# Patient Record
Sex: MALE | Race: WHITE | HISPANIC OR LATINO | Employment: FULL TIME | URBAN - METROPOLITAN AREA
[De-identification: names, ages, dates, MRNs, and addresses within clinical notes are randomized per-mention and may not be internally consistent; named-entity substitution may affect disease eponyms.]

---
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
## 2022-08-07 ENCOUNTER — HOSPITAL ENCOUNTER (OUTPATIENT)
Dept: RADIOLOGY | Facility: MEDICAL CENTER | Age: 22
End: 2022-08-07
Attending: PHYSICIAN ASSISTANT
Payer: COMMERCIAL

## 2022-08-07 ENCOUNTER — HOSPITAL ENCOUNTER (OUTPATIENT)
Facility: MEDICAL CENTER | Age: 22
End: 2022-08-07
Attending: PHYSICIAN ASSISTANT
Payer: COMMERCIAL

## 2022-08-07 ENCOUNTER — OFFICE VISIT (OUTPATIENT)
Dept: URGENT CARE | Facility: CLINIC | Age: 22
End: 2022-08-07
Payer: COMMERCIAL

## 2022-08-07 VITALS
HEIGHT: 67 IN | HEART RATE: 84 BPM | OXYGEN SATURATION: 97 % | TEMPERATURE: 98.7 F | RESPIRATION RATE: 16 BRPM | WEIGHT: 170 LBS | BODY MASS INDEX: 26.68 KG/M2 | DIASTOLIC BLOOD PRESSURE: 60 MMHG | SYSTOLIC BLOOD PRESSURE: 124 MMHG

## 2022-08-07 DIAGNOSIS — N50.89 SCROTAL SWELLING: ICD-10-CM

## 2022-08-07 DIAGNOSIS — N45.1 EPIDIDYMITIS: Primary | ICD-10-CM

## 2022-08-07 LAB
APPEARANCE UR: NORMAL
BILIRUB UR STRIP-MCNC: NEGATIVE MG/DL
COLOR UR AUTO: YELLOW
GLUCOSE UR STRIP.AUTO-MCNC: NEGATIVE MG/DL
KETONES UR STRIP.AUTO-MCNC: NEGATIVE MG/DL
LEUKOCYTE ESTERASE UR QL STRIP.AUTO: NORMAL
NITRITE UR QL STRIP.AUTO: NEGATIVE
PH UR STRIP.AUTO: 6 [PH] (ref 5–8)
PROT UR QL STRIP: NORMAL MG/DL
RBC UR QL AUTO: NORMAL
SP GR UR STRIP.AUTO: 1.03
UROBILINOGEN UR STRIP-MCNC: 0.2 MG/DL

## 2022-08-07 PROCEDURE — 87491 CHLMYD TRACH DNA AMP PROBE: CPT

## 2022-08-07 PROCEDURE — 87591 N.GONORRHOEAE DNA AMP PROB: CPT

## 2022-08-07 PROCEDURE — 99204 OFFICE O/P NEW MOD 45 MIN: CPT | Performed by: PHYSICIAN ASSISTANT

## 2022-08-07 PROCEDURE — 76870 US EXAM SCROTUM: CPT

## 2022-08-07 PROCEDURE — 87086 URINE CULTURE/COLONY COUNT: CPT

## 2022-08-07 PROCEDURE — 81002 URINALYSIS NONAUTO W/O SCOPE: CPT | Performed by: PHYSICIAN ASSISTANT

## 2022-08-07 RX ORDER — LEVOFLOXACIN 500 MG/1
500 TABLET, FILM COATED ORAL DAILY
Qty: 10 TABLET | Refills: 0 | Status: SHIPPED | OUTPATIENT
Start: 2022-08-07 | End: 2022-08-17

## 2022-08-07 ASSESSMENT — ENCOUNTER SYMPTOMS
SHORTNESS OF BREATH: 0
SORE THROAT: 0
VOMITING: 0
MYALGIAS: 0
CHILLS: 0
FEVER: 0
HEADACHES: 0
ABDOMINAL PAIN: 0
FLANK PAIN: 0
DIARRHEA: 0
EYE PAIN: 0
NAUSEA: 0
COUGH: 0
CONSTIPATION: 0

## 2022-08-07 NOTE — PROGRESS NOTES
"Subjective:   Garret Zuniga is a 22 y.o. male who presents for Testicle Swelling (X4-5days, last 2 days have been worse, painful, stung a little to pee, Pt states that one of his testicles is twice the size of the other one )      This a 22-year-old male who over the last 4 to 5 days had noticed mild right-sided testicular discomfort, similar to the feeling of getting hit in the scrotum.  Symptoms seem to wane however he was at work doing a large amount of physical labor 2 days ago and the pain seemed to be significantly worse throughout the evening.  He also noticed more focal right-sided testicular swelling.  Also over the last 48 hours he has noticed some mild stinging dysuria without frequency but he does endorse urgency.  He has noticed some discolored yellowish semen.  He is also noticed more mucopurulent urine however no cindy discharge or rash.  Remote history of testicular torsion as an infant, unsure which side.  He suspects he may have had a previous urinary tract infection when he was 15 years old or thereabouts but is not quite sure.  He reports monogamy with a single female partner, has no specific concerns about STDs.      Review of Systems   Constitutional: Negative for chills and fever.   HENT: Negative for congestion, ear pain and sore throat.    Eyes: Negative for pain.   Respiratory: Negative for cough and shortness of breath.    Cardiovascular: Negative for chest pain.   Gastrointestinal: Negative for abdominal pain, constipation, diarrhea, nausea and vomiting.   Genitourinary: Positive for dysuria and urgency. Negative for flank pain, frequency and hematuria.   Musculoskeletal: Negative for myalgias.   Skin: Negative for rash.   Neurological: Negative for headaches.       Medications, Allergies, and current problem list reviewed today in Epic.     Objective:     /60   Pulse 84   Temp 37.1 °C (98.7 °F) (Temporal)   Resp 16   Ht 1.702 m (5' 7\")   Wt 77.1 kg (170 lb)   SpO2 97% "     Physical Exam  Vitals reviewed.   Constitutional:       Appearance: Normal appearance.   HENT:      Head: Normocephalic and atraumatic.      Right Ear: External ear normal.      Left Ear: External ear normal.      Nose: Nose normal.      Mouth/Throat:      Mouth: Mucous membranes are moist.   Eyes:      Conjunctiva/sclera: Conjunctivae normal.   Cardiovascular:      Rate and Rhythm: Normal rate.   Pulmonary:      Effort: Pulmonary effort is normal.   Genitourinary:     Comments: Right-sided scrotal swelling, epididymal swelling versus epididymal cyst versus other cystic structure posterior to the right testicle.  Mildly enlarged right testicle compared to left.  Right testicle is lower hanging, no Bell Clapper deformity, cremasteric reflex intact.  Inguinal canals clear.  Otherwise uncircumcised male with no lymphadenopathy or deformity otherwise.  Skin:     General: Skin is warm and dry.      Capillary Refill: Capillary refill takes less than 2 seconds.   Neurological:      Mental Status: He is alert and oriented to person, place, and time.         Lab Results/POC Test Results   Results for orders placed or performed in visit on 08/07/22   POCT Urinalysis   Result Value Ref Range    POC Color yellow Negative    POC Appearance cloudy Negative    POC Leukocyte Esterase small Negative    POC Nitrites negative Negative    POC Urobiligen 0.2 Negative (0.2) mg/dL    POC Protein trace Negative mg/dL    POC Urine PH 6.0 5.0 - 8.0    POC Blood small Negative    POC Specific Gravity 1.030 <1.005 - >1.030    POC Ketones negative Negative mg/dL    POC Bilirubin negative Negative mg/dL    POC Glucose negative Negative mg/dL           RADIOLOGY RESULTS   [id number]    Result Date: 8/7/2022 8/7/2022 2:07 PM HISTORY/REASON FOR EXAM: Swelling; right testicular swelling, suspect epididymitis vs cyst.. TECHNIQUE/EXAM DESCRIPTION: Real-time sonography of the scrotum was performed with gray-scale, color and duplex Doppler  imaging. COMPARISON: October 27, 2014 FINDINGS: The right testis measures 4.30 cm x 2.04 cm x 2.96 cm. Normal in size and echotexture. Normal vascularity on color Doppler. No intratesticular mass. The left testis measures 4.03 cm x 2.16 cm x 3.20 cm. Normal in size and echotexture. Normal vascularity on color Doppler. No intratesticular mass. Vascular flow is symmetric. The right epididymis is enlarged and heterogeneous and hypervascular vascular. No abnormal volume hydrocele is detected. No varicocele is detected.     1.  Enlarged, heterogeneous, hyperemic right epididymis consistent with epididymitis.           Assessment/Plan:     Diagnosis and associated orders:     1. Epididymitis  levoFLOXacin (LEVAQUIN) 500 MG tablet   2. Scrotal swelling  POCT Urinalysis    [id number]    URINE CULTURE(NEW)    Chlamydia/GC, PCR (Urine)      Comments/MDM:     Patient with somewhat mixed features, does not appear to be torsion at this time especially given the subacute onset, regardless he has been on 48 hours and we were able to get him in for a timely ultrasound at 230 today.  His urinalysis does show evidence of irritation, not strongly suggestive of urinary tract infection.  At this point I have the strong suspicion for epididymitis, I do have concern of gonococcal or chlamydial infection causing this.  We will wait on the results of the scrotum ultrasound I will plan on calling the patient thereafter and we can discuss potential antibiotic therapy as well as other supportive care.    Update: Patient with ultrasound consistent with my physical exam revealing epididymitis, possible epididymal orchitis.  He McCarson with the patient he was quite confident that he is not at risk for gonorrhea or chlamydial infection, despite his age given his circumstance we will initiate levofloxacin per up-to-date guidelines.  Discussed black box warnings over telephone.  Urine culture, gynecological testing pending at this point,  we will follow-up with patient especially for need to change therapy.  Discussed nonsteroidal anti-inflammatories, supportive undergarments and follow-up as needed       Differential diagnosis, natural history, supportive care, and indications for immediate follow-up discussed.    Advised the patient to follow-up with the primary care physician for recheck, reevaluation, and consideration of further management.    Please note that this dictation was created using voice recognition software. I have made a reasonable attempt to correct obvious errors, but I expect that there are errors of grammar and possibly content that I did not discover before finalizing the note.    This note was electronically signed by Syed Isaac PA-C

## 2022-08-08 LAB
C TRACH DNA SPEC QL NAA+PROBE: NEGATIVE
N GONORRHOEA DNA SPEC QL NAA+PROBE: NEGATIVE
SPECIMEN SOURCE: NORMAL

## 2022-08-09 LAB
BACTERIA UR CULT: NORMAL
SIGNIFICANT IND 70042: NORMAL
SITE SITE: NORMAL
SOURCE SOURCE: NORMAL

## 2024-01-02 ENCOUNTER — OFFICE VISIT (OUTPATIENT)
Dept: URGENT CARE | Facility: CLINIC | Age: 24
End: 2024-01-02
Payer: COMMERCIAL

## 2024-01-02 VITALS
TEMPERATURE: 97.9 F | DIASTOLIC BLOOD PRESSURE: 70 MMHG | HEIGHT: 66 IN | OXYGEN SATURATION: 96 % | RESPIRATION RATE: 18 BRPM | HEART RATE: 74 BPM | BODY MASS INDEX: 27.64 KG/M2 | SYSTOLIC BLOOD PRESSURE: 128 MMHG | WEIGHT: 172 LBS

## 2024-01-02 DIAGNOSIS — Z87.09 HISTORY OF ASTHMA: ICD-10-CM

## 2024-01-02 DIAGNOSIS — R11.2 NAUSEA AND VOMITING, UNSPECIFIED VOMITING TYPE: ICD-10-CM

## 2024-01-02 DIAGNOSIS — J06.9 UPPER RESPIRATORY TRACT INFECTION, UNSPECIFIED TYPE: ICD-10-CM

## 2024-01-02 PROCEDURE — 3078F DIAST BP <80 MM HG: CPT

## 2024-01-02 PROCEDURE — 3074F SYST BP LT 130 MM HG: CPT

## 2024-01-02 PROCEDURE — 99214 OFFICE O/P EST MOD 30 MIN: CPT

## 2024-01-02 RX ORDER — ONDANSETRON 4 MG/1
4 TABLET, ORALLY DISINTEGRATING ORAL EVERY 6 HOURS PRN
Qty: 15 TABLET | Refills: 0 | Status: SHIPPED | OUTPATIENT
Start: 2024-01-02

## 2024-01-02 RX ORDER — ALBUTEROL SULFATE 90 UG/1
2 AEROSOL, METERED RESPIRATORY (INHALATION) EVERY 6 HOURS PRN
Qty: 8.5 G | Refills: 0 | Status: SHIPPED | OUTPATIENT
Start: 2024-01-02

## 2024-01-02 RX ORDER — METHYLPREDNISOLONE 4 MG/1
TABLET ORAL
Qty: 21 TABLET | Refills: 0 | Status: SHIPPED | OUTPATIENT
Start: 2024-01-02

## 2024-01-02 NOTE — LETTER
January 2, 2024    To Whom It May Concern:         This is confirmation that Garret Zuniga attended his scheduled appointment with SHANNA Burdick on 1/02/24.    He may return to work on 01/03/24.         If you have any questions please do not hesitate to call me at the phone number listed below.    Sincerely,          LORETTA Burdick.  [phone]

## 2024-01-03 NOTE — PROGRESS NOTES
"Subjective:   Garret Zuniga is a 23 y.o. male who presents for Other (X 5 days, started as Flu-like symptoms: headache, fever, negative Home Covid Test, lower back side of calves\"nerve\" pain, \"electricity shooting up\" Rt hand pain, Rt hip mainly pain,  SOB as he walks a lot/with movement, dizzy from the heat(showers), fever's gone away: hard time regulating temp. )      HPI:    Patient presents to  with concerns of flu-like symptoms  Reports rhinorrhea, nasal congestion, dry cough, headache  Symptoms started 5 days ago  Symptoms controlled with otc antipyretics, cold medications  Continues to have difficulty controlling body temperature, and is having \"nerve like pain\"  Reports he is having pain in his back  Emesis started today, reports 4 episodes.   Tolerating fluids. Reports decreased appetite.   Reports SOB with activity. Cough is described as bark like and has been this way his whole life.  Reports pmh of asthma, does not use controller or rescue inhalers  Denies recent exacerbations.  Negative covid test        ROS As above in HPI    Medications:    No current outpatient medications on file prior to visit.     No current facility-administered medications on file prior to visit.        Allergies:   Patient has no known allergies.    Problem List:   There is no problem list on file for this patient.       Surgical History:  Past Surgical History:   Procedure Laterality Date    DENTAL SURGERY         Past Social Hx:   Social History     Tobacco Use    Smoking status: Never    Smokeless tobacco: Never   Vaping Use    Vaping Use: Former   Substance Use Topics    Alcohol use: No    Drug use: Yes     Types: Inhaled, Marijuana     Comment: not since 12/28/23          Problem list, medications, and allergies reviewed by myself today in Epic.     Objective:     /70 (BP Location: Left arm, Patient Position: Sitting, BP Cuff Size: Adult)   Pulse 74   Temp 36.6 °C (97.9 °F) (Temporal)   Resp 18   Ht 1.664 m " "(5' 5.5\")   Wt 78 kg (172 lb)   SpO2 96%   BMI 28.19 kg/m²     Physical Exam  Vitals and nursing note reviewed.   Constitutional:       General: He is not in acute distress.     Appearance: Normal appearance. He is not ill-appearing or diaphoretic.   HENT:      Head: Normocephalic.      Right Ear: Tympanic membrane and ear canal normal.      Left Ear: Tympanic membrane and ear canal normal.      Nose: Congestion and rhinorrhea present.      Mouth/Throat:      Mouth: Mucous membranes are moist.      Pharynx: Oropharynx is clear. Posterior oropharyngeal erythema present.   Cardiovascular:      Rate and Rhythm: Normal rate and regular rhythm.      Heart sounds: Normal heart sounds. No murmur heard.     No friction rub. No gallop.   Pulmonary:      Effort: Pulmonary effort is normal. No respiratory distress.      Breath sounds: No stridor. Examination of the right-lower field reveals decreased breath sounds. Decreased breath sounds present. No wheezing, rhonchi or rales.   Chest:      Chest wall: No tenderness.   Abdominal:      General: Abdomen is flat. Bowel sounds are normal.      Palpations: Abdomen is soft.   Musculoskeletal:      Cervical back: No rigidity or tenderness.   Lymphadenopathy:      Cervical: No cervical adenopathy.   Skin:     General: Skin is warm and dry.      Capillary Refill: Capillary refill takes less than 2 seconds.      Findings: No rash.   Neurological:      Mental Status: He is alert and oriented to person, place, and time.         Assessment/Plan:       Diagnosis and associated orders:   1. Nausea and vomiting, unspecified vomiting type  - ondansetron (ZOFRAN ODT) 4 MG TABLET DISPERSIBLE; Take 1 Tablet by mouth every 6 hours as needed for Nausea/Vomiting for up to 15 doses.  Dispense: 15 Tablet; Refill: 0    2. Upper respiratory tract infection, unspecified type    3. History of asthma  - albuterol 108 (90 Base) MCG/ACT Aero Soln inhalation aerosol; Inhale 2 Puffs every 6 hours as needed " for Shortness of Breath.  Dispense: 8.5 g; Refill: 0  - methylPREDNISolone (MEDROL DOSEPAK) 4 MG Tablet Therapy Pack; Follow schedule on package instructions.  Dispense: 21 Tablet; Refill: 0  - Referral to establish with PCP        Comments/MDM:     Viral testing deferred due to duration of illness. He is aware he is outside the timeframe for antiviral medication.  Vital signs are stable, patient is nontoxic. No signs of respiratory distress.  Has pmh of mild and intermittent asthma, which has been likely more persistent due to concurrent illness. Referral to PCP ordered for spirometry.  Supportive measures encouraged: Rest, increased oral hydration, NSAIDs/tylenol as needed per package instructions, decongestant, warm humidification, otc antihistamines, Flonase, cough suppressant as needed   Strict return to ER precautions reviewed  Work note provided        Please note that this dictation was created using voice recognition software. I have made a reasonable attempt to correct obvious errors, but I expect that there are errors of grammar and possibly content that I did not discover before finalizing the note.    This note was electronically signed by LILIA Jessica

## 2024-01-16 ENCOUNTER — TELEPHONE (OUTPATIENT)
Dept: HEALTH INFORMATION MANAGEMENT | Facility: OTHER | Age: 24
End: 2024-01-16
Payer: COMMERCIAL

## 2024-02-20 ENCOUNTER — TELEPHONE (OUTPATIENT)
Dept: MEDICAL GROUP | Facility: LAB | Age: 24
End: 2024-02-20

## 2024-02-20 NOTE — LETTER
2/20/2024    Garret Zuniga  [address]  [address]  [place]  [place] [address]        Dear Garret,    Your care is very important to us, and we have noticed that on 2/20/2024, you missed your appointment with Pcp [facility] at [facility]    We’re committed to providing you with the best care possible. Your appointment time is reserved for you and your provider to discuss any current or new health concerns and, together, determine the best plan of care for you. Please call [phone] to reschedule at your earliest convenience.    In some cases, [facility] offers additional resources to make your healthcare more accessible, including transportation assistance, financial assistance and virtual visits. To learn more about these resources, please call [phone].    In order to keep you as informed as possible, below is a brief summary of our policy regarding missed appointments:  If a patient “No Shows”  three (3) or more appointments within a rolling 12-month period, they may be dismissed from the practice for failure to follow clinician recommendations.     If you have any concerns regarding the care you are receiving, please talk with your provider or call the office at [phone] and request to speak with the Practice . We’re committed to providing excellent care, and your feedback is invaluable.    Sincerely,    [facility]

## 2024-02-21 ENCOUNTER — TELEPHONE (OUTPATIENT)
Dept: HEALTH INFORMATION MANAGEMENT | Facility: OTHER | Age: 24
End: 2024-02-21
Payer: COMMERCIAL

## 2024-11-25 ENCOUNTER — OFFICE VISIT (OUTPATIENT)
Dept: URGENT CARE | Facility: CLINIC | Age: 24
End: 2024-11-25
Payer: COMMERCIAL

## 2024-11-25 VITALS
BODY MASS INDEX: 28.88 KG/M2 | WEIGHT: 184 LBS | OXYGEN SATURATION: 97 % | SYSTOLIC BLOOD PRESSURE: 124 MMHG | HEART RATE: 69 BPM | HEIGHT: 67 IN | DIASTOLIC BLOOD PRESSURE: 70 MMHG | RESPIRATION RATE: 16 BRPM | TEMPERATURE: 99.3 F

## 2024-11-25 DIAGNOSIS — J02.9 SORE THROAT: ICD-10-CM

## 2024-11-25 LAB — S PYO DNA SPEC NAA+PROBE: NOT DETECTED

## 2024-11-25 PROCEDURE — 3074F SYST BP LT 130 MM HG: CPT | Performed by: PHYSICIAN ASSISTANT

## 2024-11-25 PROCEDURE — 3078F DIAST BP <80 MM HG: CPT | Performed by: PHYSICIAN ASSISTANT

## 2024-11-25 PROCEDURE — 99213 OFFICE O/P EST LOW 20 MIN: CPT | Performed by: PHYSICIAN ASSISTANT

## 2024-11-25 PROCEDURE — 87651 STREP A DNA AMP PROBE: CPT | Performed by: PHYSICIAN ASSISTANT

## 2024-11-25 NOTE — PROGRESS NOTES
"Subjective:     Verbal consent was acquired by the patient to use [employer] ambient listening note generation during this visit     Garret Zuniga is a 24 y.o. male who presents for Sore Throat (X2 days, saw white spots on the right side. )       History of Present Illness  The patient is a 24-year-old male who presents for evaluation of a sore throat.    He reports experiencing a sore throat accompanied by white spots on his tonsils. The pain, which began yesterday, is particularly noticeable when swallowing. He had a brief respite from the sore throat a few days ago, but it has since returned. Despite the discomfort, he is able to eat and has not been in contact with anyone who is ill.    He mentions a slight congestion, which he considers normal for him. His energy levels are satisfactory, and he managed to get 4.5 hours of sleep last night.    He reports no history of mononucleosis, abdominal pain, nausea, or ear pain.            Medications:  albuterol Aers  methylPREDNISolone Tbpk  ondansetron Tbdp    Allergies:             Patient has no known allergies.    Past Social Hx:  Garret Zuniga  reports that he has never smoked. He has never used smokeless tobacco. He reports current drug use. Drugs: Inhaled and Marijuana. He reports that he does not drink alcohol.           Problem list, medications, and allergies reviewed by myself today in Epic.     Objective:     /70 (BP Location: Left arm, Patient Position: Sitting, BP Cuff Size: Adult)   Pulse 69   Temp 37.4 °C (99.3 °F)   Resp 16   Ht 1.702 m (5' 7\")   Wt 83.5 kg (184 lb)   SpO2 97%   BMI 28.82 kg/m²     Physical Exam  Vitals and nursing note reviewed.   Constitutional:       General: He is not in acute distress.     Appearance: Normal appearance. He is well-developed. He is not ill-appearing or diaphoretic.   HENT:      Head: Normocephalic.      Right Ear: Tympanic membrane normal.      Left Ear: Tympanic membrane normal.      Nose: " Congestion and rhinorrhea present.      Mouth/Throat:      Palate: No mass.      Pharynx: Posterior oropharyngeal erythema present. No pharyngeal swelling, oropharyngeal exudate or uvula swelling.      Tonsils: No tonsillar exudate or tonsillar abscesses. 1+ on the right. 1+ on the left.   Eyes:      Conjunctiva/sclera: Conjunctivae normal.      Pupils: Pupils are equal, round, and reactive to light.   Cardiovascular:      Rate and Rhythm: Normal rate and regular rhythm.      Pulses: Normal pulses.      Heart sounds: No murmur heard.  Pulmonary:      Effort: Pulmonary effort is normal. No tachypnea, accessory muscle usage or respiratory distress.      Breath sounds: Normal breath sounds. No stridor. No decreased breath sounds, wheezing, rhonchi or rales.      Comments: Lungs are clear to auscultation bilaterally without rhonchi rales or wheezes  Musculoskeletal:         General: Normal range of motion.      Cervical back: Normal range of motion and neck supple.   Skin:     General: Skin is warm and dry.   Neurological:      Mental Status: He is alert and oriented to person, place, and time.   Psychiatric:         Behavior: Behavior is cooperative.           Results for orders placed or performed in visit on 11/25/24   POCT CEPHEID GROUP A STREP - PCR    Collection Time: 11/25/24 10:48 AM   Result Value Ref Range    POC Group A Strep, PCR Not Detected Not Detected, Invalid           Assessment/Plan:     Diagnosis and Associated Orders:     1. Sore throat  - POCT CEPHEID GROUP A STREP - PCR        Medical Decision Making:    James 24 y.o. presents to clinic with: Sore throat    Assessment & Plan  PCR negative  He reports a sore throat with mild pain when swallowing, and low-grade fever. There is no significant cough, cold, or congestion. Physical examination reveals a red and slightly edematous throat.  He was advised to perform salt water gargles and continue with Tylenol for pain management.   Strep PCR negative.   Suspect viral etiology.  Vital signs stable and reassuring.  No indication for antibiotics today.  Warm salt water gargles.  Alternate tylenol and ibuprofen for pain.  Soft foods and cool liquids.  Throat lozenges as directed.  I considered other causes of pharyngitis including Group C, G strep, peritonsillar abscess, Mononucleosis, ena's angina, and retropharyngeal abscess but the patient's reported symptoms and my exam do not support these alternative diagnosis based on information I have available today.  This may change and I encouraged the patient to return to clinic if they are experiencing new symptoms or their symptoms fail to resolve with time as we cannot rule out all pathology from a single Urgent Care visit.     The short duration of patient's symptoms and lack of constitutional symptoms made testing for Mono futile.  Since it takes 6-10 days for your body to begin to produce antibodies (which monospot tests for), there is a very high likelihood of a false negative.  I encouraged the patient to return to clinic in 1-2 weeks if they feel other constitutional symptoms that would suggest EBV/Mono.      I personally reviewed prior external notes and test results pertinent to today's visit. Patient is clinically stable at today's urgent care visit.  Patient appears nontoxic with no acute distress noted. Appropriate for outpatient care at this time.  Return to clinic or go to ED if symptoms worsen or persist.  Red flag symptoms discussed.  Patient/Parent/Guardian voices understanding.   All side effects of medication discussed including allergic response, GI upset, tendon injury, rash, sedation etc    Please note that this dictation was created using voice recognition software. I have made a reasonable attempt to correct obvious errors, but I expect that there are errors of grammar and possibly content that I did not discover before finalizing the note.    This note was electronically signed by Sandra  BENITO Lynch